# Patient Record
Sex: FEMALE | Race: WHITE | NOT HISPANIC OR LATINO | URBAN - METROPOLITAN AREA
[De-identification: names, ages, dates, MRNs, and addresses within clinical notes are randomized per-mention and may not be internally consistent; named-entity substitution may affect disease eponyms.]

---

## 2024-05-22 ENCOUNTER — APPOINTMENT (OUTPATIENT)
Dept: OBSTETRICS AND GYNECOLOGY | Facility: CLINIC | Age: 49
End: 2024-05-22
Payer: COMMERCIAL

## 2024-07-29 ENCOUNTER — APPOINTMENT (OUTPATIENT)
Dept: OBSTETRICS AND GYNECOLOGY | Facility: CLINIC | Age: 49
End: 2024-07-29
Payer: COMMERCIAL

## 2024-07-29 VITALS — HEIGHT: 66 IN | BODY MASS INDEX: 21.86 KG/M2 | WEIGHT: 136 LBS

## 2024-07-29 DIAGNOSIS — Z01.419 ENCOUNTER FOR GYNECOLOGICAL EXAMINATION WITHOUT ABNORMAL FINDING: Primary | ICD-10-CM

## 2024-07-29 PROCEDURE — 3008F BODY MASS INDEX DOCD: CPT | Performed by: OBSTETRICS & GYNECOLOGY

## 2024-07-29 PROCEDURE — 99396 PREV VISIT EST AGE 40-64: CPT | Performed by: OBSTETRICS & GYNECOLOGY

## 2024-07-29 RX ORDER — LEVOTHYROXINE SODIUM 112 UG/1
1 TABLET ORAL
COMMUNITY
Start: 2023-06-08

## 2024-07-29 RX ORDER — MELOXICAM 15 MG/1
1 TABLET ORAL DAILY
COMMUNITY
Start: 2014-07-03

## 2024-07-29 RX ORDER — HYDROXYCHLOROQUINE SULFATE 200 MG/1
300 TABLET, FILM COATED ORAL
COMMUNITY
Start: 2023-10-09

## 2024-07-29 NOTE — PROGRESS NOTES
Subjective   Radha Novak is a 48 y.o. female here for a routine exam.  She had her last menses in 2023.  No bleeding since.    She has mild menopausal symptoms.    She has no pelvic pain, no dysuria, no change in bowel habits.  She is current on her colonoscopy.    We discussed her oldest son is starting at [employer] in the fall.     personal health questionnaire reviewed: yes.     Gynecologic History  No LMP recorded.  Contraception: rhythm method  Last Pap: 22. Results were: normal  Last mammogram: 23. Results were: normal    Obstetric History  OB History    Para Term  AB Living   3 3 1 2 0 3   SAB IAB Ectopic Multiple Live Births   0 0 0 2 3      # Outcome Date GA Lbr Jeffrey/2nd Weight Sex Type Anes PTL Lv   3             2             1 Term                Objective   Constitutional: Alert and in no acute distress. Well developed, well nourished.   Head and Face: Head and face: Normal.    Eyes: Normal external exam - nonicteric sclera, extraocular movements intact (EOMI) and no ptosis.   Neck: No neck asymmetry. Supple. Thyroid not enlarged and there were no palpable thyroid nodules.    Pulmonary: No respiratory distress.   Chest: Breasts: Normal appearance, no nipple discharge and no skin changes. Palpation of breasts and axillae: No palpable mass and no axillary lymphadenopathy.   Abdomen: Soft nontender; no abdominal mass palpated. No organomegaly. No hernias.   Genitourinary: External genitalia: Normal. No inguinal lymphadenopathy. Bartholin's Urethral and Skenes Glands: Normal. Urethra: Normal.  Bladder: Normal on palpation. Vagina: Normal. Cervix: Normal.  Uterus: Normal.  Right Adnexa/parametria: Normal.  Left Adnexa/parametria: Normal.  Inspection of Perianal Area: Normal.   Musculoskeletal: No joint swelling seen, normal movements of all extremities.   Skin: Normal skin color and pigmentation, normal skin turgor, and no rash.    Neurologic: Non-focal. Grossly intact.   Psychiatric: Alert and oriented x 3. Affect normal to patient baseline. Mood: Appropriate.  Physical Exam     Assessment/Plan   Healthy female exam.  This is a 48-year-old female with a normal exam.  No Pap was sent, she is high risk HPV negative.    Her routine mammogram was ordered with tomosynthesis.  She is perimenopausal, and her last menses was December 2023.    We discussed menopause will be after 1 year of amenorrhea.    She will call me with any concerns.  Mammogram ordered.

## 2024-07-30 ENCOUNTER — APPOINTMENT (OUTPATIENT)
Dept: OBSTETRICS AND GYNECOLOGY | Facility: CLINIC | Age: 49
End: 2024-07-30
Payer: COMMERCIAL

## 2024-08-23 ENCOUNTER — APPOINTMENT (OUTPATIENT)
Dept: RADIOLOGY | Facility: CLINIC | Age: 49
End: 2024-08-23
Payer: COMMERCIAL

## 2024-09-06 ENCOUNTER — APPOINTMENT (OUTPATIENT)
Dept: RADIOLOGY | Facility: CLINIC | Age: 49
End: 2024-09-06
Payer: COMMERCIAL

## 2024-09-24 ENCOUNTER — HOSPITAL ENCOUNTER (OUTPATIENT)
Dept: RADIOLOGY | Facility: CLINIC | Age: 49
Discharge: HOME | End: 2024-09-24
Payer: COMMERCIAL

## 2024-09-24 VITALS — BODY MASS INDEX: 21.86 KG/M2 | HEIGHT: 66 IN | WEIGHT: 136.02 LBS

## 2024-09-24 DIAGNOSIS — Z01.419 ENCOUNTER FOR GYNECOLOGICAL EXAMINATION WITHOUT ABNORMAL FINDING: ICD-10-CM

## 2024-09-24 DIAGNOSIS — Z12.31 ENCOUNTER FOR SCREENING MAMMOGRAM FOR MALIGNANT NEOPLASM OF BREAST: ICD-10-CM

## 2024-09-24 PROCEDURE — 77063 BREAST TOMOSYNTHESIS BI: CPT | Performed by: RADIOLOGY

## 2024-09-24 PROCEDURE — 77067 SCR MAMMO BI INCL CAD: CPT | Performed by: RADIOLOGY

## 2024-09-24 PROCEDURE — 77067 SCR MAMMO BI INCL CAD: CPT

## 2025-05-19 ENCOUNTER — OFFICE VISIT (OUTPATIENT)
Dept: URGENT CARE | Age: 50
End: 2025-05-19
Payer: COMMERCIAL

## 2025-05-19 VITALS
HEART RATE: 56 BPM | TEMPERATURE: 98 F | HEIGHT: 66 IN | OXYGEN SATURATION: 97 % | RESPIRATION RATE: 12 BRPM | DIASTOLIC BLOOD PRESSURE: 75 MMHG | WEIGHT: 140 LBS | SYSTOLIC BLOOD PRESSURE: 114 MMHG | BODY MASS INDEX: 22.5 KG/M2

## 2025-05-19 DIAGNOSIS — H00.014 HORDEOLUM EXTERNUM OF LEFT UPPER EYELID: Primary | ICD-10-CM

## 2025-05-19 PROCEDURE — 99203 OFFICE O/P NEW LOW 30 MIN: CPT | Performed by: NURSE PRACTITIONER

## 2025-05-19 PROCEDURE — 1036F TOBACCO NON-USER: CPT | Performed by: NURSE PRACTITIONER

## 2025-05-19 PROCEDURE — 3008F BODY MASS INDEX DOCD: CPT | Performed by: NURSE PRACTITIONER

## 2025-05-19 RX ORDER — ERYTHROMYCIN 5 MG/G
OINTMENT OPHTHALMIC EVERY 6 HOURS
Qty: 3 G | Refills: 0 | Status: SHIPPED | OUTPATIENT
Start: 2025-05-19 | End: 2025-05-29

## 2025-05-19 ASSESSMENT — PAIN SCALES - GENERAL: PAINLEVEL_OUTOF10: 5

## 2025-05-19 ASSESSMENT — ENCOUNTER SYMPTOMS: EYE DISCHARGE: 1

## 2025-05-19 NOTE — PROGRESS NOTES
"Subjective   Patient ID: Radha Novak is a 49 y.o. female. They present today with a chief complaint of Eye Problem (Stye on left eye lid. Symptoms for 10 days. ).    History of Present Illness    Eye Problem  Associated symptoms: discharge        Past Medical History  Allergies as of 05/19/2025    (No Known Allergies)       Prescriptions Prior to Admission[1]     Medical History[2]    Surgical History[3]     reports that she has never smoked. She has never been exposed to tobacco smoke. She has never used smokeless tobacco. She reports current alcohol use. She reports that she does not use drugs.    Review of Systems  Review of Systems   Eyes:  Positive for discharge.   All other systems reviewed and are negative.                                 Objective    Vitals:    05/19/25 1727   BP: 114/75   BP Location: Left arm   Patient Position: Sitting   BP Cuff Size: Adult   Pulse: 56   Resp: 12   Temp: 36.7 °C (98 °F)   TempSrc: Oral   SpO2: 97%   Weight: 63.5 kg (140 lb)   Height: 1.676 m (5' 6\")     No LMP recorded (lmp unknown).    Physical Exam  Vitals and nursing note reviewed.   Constitutional:       Appearance: Normal appearance.   HENT:      Head: Normocephalic.      Nose: Nose normal.      Mouth/Throat:      Mouth: Mucous membranes are moist.      Pharynx: Oropharynx is clear.   Eyes:      General:         Left eye: Hordeolum present.     Extraocular Movements: Extraocular movements intact.      Conjunctiva/sclera: Conjunctivae normal.      Pupils: Pupils are equal, round, and reactive to light.        Comments: Intact erythematous hordeolum to left upper eyelid at outer canthus   Cardiovascular:      Rate and Rhythm: Normal rate.   Pulmonary:      Effort: Pulmonary effort is normal.   Musculoskeletal:         General: Normal range of motion.      Cervical back: Normal range of motion and neck supple.   Skin:     General: Skin is warm and dry.      Capillary Refill: Capillary refill takes 2 to 3 seconds. "   Neurological:      General: No focal deficit present.      Mental Status: She is alert.   Psychiatric:         Mood and Affect: Mood normal.         Procedures    Point of Care Test & Imaging Results from this visit  No results found for this visit on 05/19/25.   Imaging  No results found.    Cardiology, Vascular, and Other Imaging  No other imaging results found for the past 2 days      Diagnostic study results (if any) were reviewed by SELENA Duran.    Assessment/Plan   Allergies, medications, history, and pertinent labs/EKGs/Imaging reviewed by SELENA Duran.     Medical Decision Making  48 y/o F PMH lupus, presents with reoccurring stye to left upper eyelid. Pt had 2 styes to left upper eyelid near inner canthus, went away in 5 days and then another reappeared to left upper eyelid outer canthus. Pt has not taken her lupus meds in 10 days and has in the same contacts for a month, waiting on her eyeglasses.  PE is consistent with hordeolum to left upper eyelid adjacent to left outer canthus.  Advised warm compress as she has been doing  TAKE OUT CONTACTS she has used with last stye, and apply new ones but did advise to wear glasses but pt will not have for a week  Erythromycin ointment prescribed and advised DO NOT scratch or touch  If s/s worsen, f/u optometrist    Follow up Care: Pt instructed to follow-up with PCP or other appropriate clinician within 24 to 48 hours. Report to ED if there is any development in worsening pain, difficulty swallowing, change in phonation, fever, chills, neck pain, photophobia, headache, neck stiffness, chest pain, abdominal pain, vomiting, syncope, hemoptysis, leg swelling SOB, fever, facial swelling, eye pain, periorbital swelling/erythema, or any new signs or sx.     The patient was educated regarding diagnosis, supportive care, OTC and Rx medications. The patient was given the opportunity to ask questions prior to discharge. They verbalized  understanding of my discussion of the plans for treatment, expected course, indications to return to  or seek further evaluation in ED, and the need for timely follow up as directed.       Take ibuprofen or another over-the-counter pain killer.  Use over-the-counter lubricating eye drops (artificial tears).  Put a warm, damp washcloth over your eyes for a few minutes. To make this warm compress:  Soak a clean washcloth in warm water then wring it out so it’s not dripping.  Lay the damp cloth over your eyes and leave it in place until it cools.  Repeat this several times a day, or as often as is comfortable.  Use a clean washcloth each time so you don't spread the infection.  Use a different washcloth for each eye if you have infectious pink eye in both eyes.  If your eyelids are sticking together, a warm washcloth can loosen the dried mucus so you can open your eyes.      Basic hygiene is enough to keep from spreading the infection to other people or your other eye.    Change pillowcases and sheets every day.  Use a fresh towel every day.  Wash your hands often, especially after you touch your eyes.  Don't wear your contact lenses until your eyes are back to normal. Don't share anything that touches your eyes.  1. Gently clean any mucus from the eye with a soft, wet cloth.         2. Everyone in the house should frequently wash their hands often with soap and water or hand  and avoid sharing towels.  3. Do not use contact lenses unless the eye doctor has approved this.    Call your doctor or go to the emergency department if worse or:  1. Eye pain is not better or getting worse.  2. Vision is blurry or if you have changes in vision.  3. Infection is not improved in 2 days.  4. Eyelid or face becomes red or swollen.  5. Fever occurs.      Orders and Diagnoses  Diagnoses and all orders for this visit:  Hordeolum externum of left upper eyelid  -     erythromycin (Romycin) 5 mg/gram (0.5 %) ophthalmic  ointment; Apply to left eye every 6 hours for 10 days. Apply Amount per Dose: 1 inch (2.5 cm) per dose.      Medical Admin Record      Patient disposition: Home    Electronically signed by SELENA Duran  5:45 PM           [1] (Not in a hospital admission)   [2] History reviewed. No pertinent past medical history.  [3] History reviewed. No pertinent surgical history.

## 2025-07-29 ENCOUNTER — APPOINTMENT (OUTPATIENT)
Dept: OBSTETRICS AND GYNECOLOGY | Facility: CLINIC | Age: 50
End: 2025-07-29
Payer: COMMERCIAL

## 2025-09-29 ENCOUNTER — APPOINTMENT (OUTPATIENT)
Facility: CLINIC | Age: 50
End: 2025-09-29
Payer: COMMERCIAL